# Patient Record
Sex: FEMALE | Race: WHITE | Employment: FULL TIME | URBAN - METROPOLITAN AREA
[De-identification: names, ages, dates, MRNs, and addresses within clinical notes are randomized per-mention and may not be internally consistent; named-entity substitution may affect disease eponyms.]

---

## 2018-01-31 ENCOUNTER — HOSPITAL ENCOUNTER (OUTPATIENT)
Dept: MAMMOGRAPHY | Age: 43
Discharge: HOME | End: 2018-01-31
Attending: OBSTETRICS & GYNECOLOGY
Payer: COMMERCIAL

## 2018-01-31 DIAGNOSIS — Z12.31 VISIT FOR SCREENING MAMMOGRAM: ICD-10-CM

## 2018-01-31 PROCEDURE — 77067 SCR MAMMO BI INCL CAD: CPT

## 2018-02-08 ENCOUNTER — HOSPITAL ENCOUNTER (OUTPATIENT)
Dept: MAMMOGRAPHY | Age: 43
Discharge: HOME | End: 2018-02-08
Attending: OBSTETRICS & GYNECOLOGY
Payer: COMMERCIAL

## 2018-02-08 DIAGNOSIS — R92.8 MAMMOGRAM ABNORMAL: ICD-10-CM

## 2018-02-08 PROCEDURE — 76642 ULTRASOUND BREAST LIMITED: CPT

## 2022-03-22 NOTE — PROGRESS NOTES
HPI: Maldonado Loyola (: 1975) is a 55 y.o. female, patient, here for evaluation of the following chief complaint(s):    Finger Pain (Right middle greater than left index finger clicking, locking, pain for 2+ months. Right hand dominant.)  Patient is seen today to evaluate her hands. She is already had bilateral open carpal tunnel releases with revisions. She now has developed catching, triggering locking mostly involving her right middle and left index finger. This has been worsening since the beginning of . She also is developing milder changes to the left middle finger. She complains of the clicking locking and pain with some burning pain that is local.  She really denies any numbness or tingling. There has been no fall or injury. Vitals:  Ht 5' 2\" (1.575 m)   Wt 145 lb (65.8 kg)   BMI 26.52 kg/m²    Body mass index is 26.52 kg/m². Allergies   Allergen Reactions    Bactrim [Sulfamethoprim Ds] Hives    Minocin [Minocycline] Hives       Current Outpatient Medications   Medication Sig    diazepam (VALIUM) 5 mg tablet Take 1 Tab by mouth every six (6) hours as needed for Anxiety. (Patient not taking: Reported on 3/23/2022)    promethazine (PHENERGAN) 12.5 mg tablet Take 1 Tab by mouth every six (6) hours as needed for Nausea. (Patient not taking: Reported on 3/23/2022)    oxyCODONE-acetaminophen (PERCOCET) 5-325 mg per tablet Take 2 Tabs by mouth every four (4) hours as needed for Pain. (Patient not taking: Reported on 3/23/2022)     No current facility-administered medications for this visit. Past Medical History:   Diagnosis Date    Nausea & vomiting     with anesthesia        Past Surgical History:   Procedure Laterality Date    HX DILATION AND CURETTAGE      X2    HX GYN      ABLATION    HX HEENT      LASIK BILAT EYES    HX HEENT      WISDOM TEETH    HX ORTHOPAEDIC      LEFT KNEE ARTHROSCOPY X2    NEUROLOGICAL PROCEDURE UNLISTED      CARPAL TUNNEL BILAT HANDS. History reviewed. No pertinent family history. Social History     Tobacco Use    Smoking status: Never Smoker    Smokeless tobacco: Never Used   Substance Use Topics    Alcohol use: Yes     Comment: RARELY    Drug use: Not on file        Review of Systems    ROS     Positive for: Musculoskeletal    Last edited by Maggie Velasquez on 3/23/2022  8:48 AM. (History)             Physical Exam    Patient in general demonstrates good elbow, forearm, wrist and hand range of motion bilaterally. She has point tenderness of the right more than left middle finger and the left index finger all at the A1 pulley level. She is clicking and locking most pronounced in the right middle and left index fingers. She has healed scars in the each proximal palm from her prior carpal tunnel surgeries. Imaging:    No new x-rays today    ASSESSMENT/PLAN:  Below is the assessment and plan developed based on review of pertinent history, physical exam, labs, studies, and medications. Patient examination was consistent with stenosing tenosynovitis involving right more than left middle finger and left index fingers. She has had prior revision carpal tunnel releases and ultimately recovered well. I reviewed treatment options and answered her questions. She prefers to undergo bilateral middle and left index trigger finger A1 pulley releases rather than consider injection therapy. I reviewed risks that include but not limited to stiffness, pain, nerve or tendon damage and incomplete relief of pain and locking. Arrangements can be made for this to be performed on an outpatient basis as soon as possible. 1. Right hand pain  2. Trigger middle finger of right hand  3. Status post carpal tunnel release  4. Trigger finger, left middle finger  5. Trigger finger, left index finger      Return for Follow-up 7 to 10 days after surgery. .    An electronic signature was used to authenticate this note.   -- Gale Hernandez MD

## 2022-03-23 ENCOUNTER — OFFICE VISIT (OUTPATIENT)
Dept: ORTHOPEDIC SURGERY | Age: 47
End: 2022-03-23
Payer: COMMERCIAL

## 2022-03-23 VITALS — WEIGHT: 145 LBS | HEIGHT: 62 IN | BODY MASS INDEX: 26.68 KG/M2

## 2022-03-23 DIAGNOSIS — M65.322 TRIGGER FINGER, LEFT INDEX FINGER: ICD-10-CM

## 2022-03-23 DIAGNOSIS — Z98.890 STATUS POST CARPAL TUNNEL RELEASE: ICD-10-CM

## 2022-03-23 DIAGNOSIS — M79.641 RIGHT HAND PAIN: Primary | ICD-10-CM

## 2022-03-23 DIAGNOSIS — M65.331 TRIGGER MIDDLE FINGER OF RIGHT HAND: ICD-10-CM

## 2022-03-23 DIAGNOSIS — M65.332 TRIGGER FINGER, LEFT MIDDLE FINGER: ICD-10-CM

## 2022-03-23 PROCEDURE — 99213 OFFICE O/P EST LOW 20 MIN: CPT | Performed by: ORTHOPAEDIC SURGERY

## 2022-03-23 NOTE — PATIENT INSTRUCTIONS
Trigger Finger: Care Instructions  Overview  A trigger finger is a finger stuck in a bent position. It happens when the tendon that bends and straightens the thumb or finger can't slide smoothly under the ligaments that hold the tendon against the bones. In most cases, it's caused by a bump (nodule) that forms on the tendon. The bent finger usually straightens out on its own. A trigger finger can be painful. But it normally isn't a serious problem. Trigger fingers seem to occur more in some groups of people. These groups include:  · People who have diabetes or arthritis. · People who have injured their hands in the past.  · Musicians. · People who  tools often. Rest and exercises may help your finger relax so that it can bend. You may get a corticosteroid shot. This can reduce swelling and pain. Your doctor may put a splint on your finger. It will give your finger some rest. You may need surgery if the finger keeps locking in a bent position. Follow-up care is a key part of your treatment and safety. Be sure to make and go to all appointments, and call your doctor if you are having problems. It's also a good idea to know your test results and keep a list of the medicines you take. How can you care for yourself at home? · If your doctor put a splint on your finger, wear it as directed. Don't take it off until your doctor says you can. · You may need to change your activities to avoid movements that irritate the finger. · Take your medicines exactly as prescribed. Call your doctor if you think you are having a problem with your medicine. · Ask your doctor if you can take an over-the-counter pain medicine, such as acetaminophen (Tylenol), ibuprofen (Advil, Motrin), or naproxen (Aleve). Be safe with medicines. Read and follow all instructions on the label. · If your doctor recommends exercises, do them as directed. When should you call for help?    Call your doctor now or seek immediate medical care if:    · Your finger locks in a bent position and will not straighten. Watch closely for changes in your health, and be sure to contact your doctor if:    · You do not get better as expected. Where can you learn more? Go to http://www.stephen[employer].com/  Enter M826 in the search box to learn more about \"Trigger Finger: Care Instructions. \"  Current as of: July 1, 2021               Content Version: 13.2  © 2006-2022 [employer] Care instructions adapted under license by [email or web address] (which disclaims liability or warranty for this information). If you have questions about a medical condition or this instruction, always ask your healthcare professional. [address] any warranty or liability for your use of this information.

## 2022-03-25 DIAGNOSIS — M65.331 TRIGGER MIDDLE FINGER OF RIGHT HAND: Primary | ICD-10-CM

## 2022-03-25 DIAGNOSIS — M65.332 TRIGGER FINGER, LEFT MIDDLE FINGER: ICD-10-CM

## 2022-03-25 DIAGNOSIS — M65.322 TRIGGER FINGER, LEFT INDEX FINGER: ICD-10-CM

## 2022-04-11 DIAGNOSIS — M65.332 TRIGGER FINGER, LEFT MIDDLE FINGER: Primary | ICD-10-CM

## 2022-04-11 DIAGNOSIS — M65.322 TRIGGER FINGER, LEFT INDEX FINGER: ICD-10-CM

## 2022-04-11 RX ORDER — HYDROCODONE BITARTRATE AND ACETAMINOPHEN 5; 325 MG/1; MG/1
1 TABLET ORAL
Qty: 15 TABLET | Refills: 0 | Status: SHIPPED | OUTPATIENT
Start: 2022-04-11 | End: 2022-04-14

## 2022-04-20 NOTE — PROGRESS NOTES
HPI: Keshawn Smith (: 1975) is a 55 y.o. female, patient, here for evaluation of the following chief complaint(s):    Surgical Follow-up (Bilateral thumb, index, middle trigger finger release on 22.)  Patient is seen today to evaluate her hands. She is already had bilateral open carpal tunnel releases with revisions. She now has developed catching, triggering locking mostly involving her right middle and left index finger. This has been worsening since the beginning of . She also is developing milder changes to the left middle finger. She complains of the clicking locking and pain with some burning pain that is local.  She really denies any numbness or tingling. She underwent bilateral middle and index trigger finger A1 pulley releases as well as bilateral trigger thumb releases on 2022. Vitals: There were no vitals taken for this visit. There is no height or weight on file to calculate BMI. Allergies   Allergen Reactions    Bactrim [Sulfamethoprim Ds] Hives    Minocin [Minocycline] Hives       Current Outpatient Medications   Medication Sig    diazepam (VALIUM) 5 mg tablet Take 1 Tab by mouth every six (6) hours as needed for Anxiety. (Patient not taking: Reported on 3/23/2022)    promethazine (PHENERGAN) 12.5 mg tablet Take 1 Tab by mouth every six (6) hours as needed for Nausea. (Patient not taking: Reported on 3/23/2022)    oxyCODONE-acetaminophen (PERCOCET) 5-325 mg per tablet Take 2 Tabs by mouth every four (4) hours as needed for Pain. (Patient not taking: Reported on 3/23/2022)     No current facility-administered medications for this visit.        Past Medical History:   Diagnosis Date    Nausea & vomiting     with anesthesia        Past Surgical History:   Procedure Laterality Date    HX DILATION AND CURETTAGE      X2    HX GYN      ABLATION    HX HEENT      LASIK BILAT EYES    HX HEENT      WISDOM TEETH    HX ORTHOPAEDIC      LEFT KNEE ARTHROSCOPY X2  NEUROLOGICAL PROCEDURE UNLISTED      CARPAL TUNNEL BILAT HANDS. History reviewed. No pertinent family history. Social History     Tobacco Use    Smoking status: Never Smoker    Smokeless tobacco: Never Used   Substance Use Topics    Alcohol use: Yes     Comment: RARELY    Drug use: Not on file        Review of Systems   All other systems reviewed and are negative. ROS     Positive for: Musculoskeletal    Last edited by Gilbert Streeter on 4/22/2022  8:40 AM. (History)             Physical Exam    Overall her surgical wounds are healing well bilaterally to the middle index and thumb. There is no redness drainage or sign infection. Good early range of motion and no further locking. She has healed scars in the each proximal palm from her prior carpal tunnel surgeries. Sutures were removed and there is no sign of infection. Imaging:    No new x-rays today    ASSESSMENT/PLAN:  Below is the assessment and plan developed based on review of pertinent history, physical exam, labs, studies, and medications. Patient examination was consistent with stenosing tenosynovitis involving right more than left middle finger and left index fingers. She has had prior revision carpal tunnel releases and ultimately recovered well. I reviewed treatment options and answered her questions. She underwent bilateral thumb, index and middle trigger finger A1 pulley releases on 4/12/2022. She may continue with simple wound care, gentle motion and strength. He needs to especially work on avoid any contracture such as the right middle finger PIP but overall is doing very well. Sutures removed and benzoin and Steri-Strips applied with Ace bandage wraps on 4/22/2022. Follow-up in 3 to 4 weeks. 1. Trigger middle finger of right hand  2. Trigger finger, left middle finger  3. Trigger finger, left index finger  4. Status post carpal tunnel release  5.  Right hand pain      Return in about 4 weeks (around 5/20/2022). An electronic signature was used to authenticate this note.   -- Kya Barajas MD

## 2022-04-22 ENCOUNTER — OFFICE VISIT (OUTPATIENT)
Dept: ORTHOPEDIC SURGERY | Age: 47
End: 2022-04-22
Payer: COMMERCIAL

## 2022-04-22 DIAGNOSIS — M79.641 RIGHT HAND PAIN: ICD-10-CM

## 2022-04-22 DIAGNOSIS — M65.332 TRIGGER FINGER, LEFT MIDDLE FINGER: ICD-10-CM

## 2022-04-22 DIAGNOSIS — Z98.890 STATUS POST CARPAL TUNNEL RELEASE: ICD-10-CM

## 2022-04-22 DIAGNOSIS — M65.322 TRIGGER FINGER, LEFT INDEX FINGER: ICD-10-CM

## 2022-04-22 DIAGNOSIS — M65.331 TRIGGER MIDDLE FINGER OF RIGHT HAND: Primary | ICD-10-CM

## 2022-04-22 PROCEDURE — 99024 POSTOP FOLLOW-UP VISIT: CPT | Performed by: ORTHOPAEDIC SURGERY

## 2024-10-25 ENCOUNTER — HOSPITAL ENCOUNTER (OUTPATIENT)
Facility: HOSPITAL | Age: 49
Discharge: HOME | End: 2024-10-28
Attending: ORTHOPAEDIC SURGERY
Payer: COMMERCIAL

## 2024-10-25 DIAGNOSIS — M50.90 CERVICAL DISC DISEASE: ICD-10-CM

## 2024-10-25 PROCEDURE — 72141 MRI NECK SPINE W/O DYE: CPT

## 2025-04-21 ENCOUNTER — OFFICE VISIT (OUTPATIENT)
Age: 50
End: 2025-04-21
Payer: COMMERCIAL

## 2025-04-21 VITALS
BODY MASS INDEX: 26.31 KG/M2 | HEART RATE: 77 BPM | HEIGHT: 62 IN | RESPIRATION RATE: 17 BRPM | WEIGHT: 143 LBS | OXYGEN SATURATION: 99 % | DIASTOLIC BLOOD PRESSURE: 86 MMHG | SYSTOLIC BLOOD PRESSURE: 120 MMHG

## 2025-04-21 DIAGNOSIS — E03.9 ACQUIRED HYPOTHYROIDISM: ICD-10-CM

## 2025-04-21 DIAGNOSIS — E03.9 ACQUIRED HYPOTHYROIDISM: Primary | ICD-10-CM

## 2025-04-21 PROCEDURE — 99204 OFFICE O/P NEW MOD 45 MIN: CPT | Performed by: INTERNAL MEDICINE

## 2025-04-21 RX ORDER — THYROID,PORK 15 MG
15 TABLET ORAL DAILY
COMMUNITY
Start: 2025-04-13

## 2025-04-21 RX ORDER — PROGESTERONE 100 MG/1
100 CAPSULE ORAL DAILY
COMMUNITY
Start: 2025-04-13

## 2025-04-21 NOTE — PATIENT INSTRUCTIONS
1) I will repeat your thyroid tests to see if you need a change in your dose of armour.   I will send you a message through [employer] with your lab results.  Please call [phone] to reset your [email or web address] password.    2) I will also check your thyroid antibody levels to screen for an autoimmune condition called hashimoto's disease which is the most common cause of hypothyroidism in the world and can be genetic meaning your children could be at risk of developing a thyroid condition in the future.      3) Move the multivitamin to at least 4 hours apart from the armour thyroid.  If you ever do miss a dose of thyroid, it's okay to take 2 pills the next day to catch up on your dose.  The goal is always to get 7 pills per week and even if you have to double up several days in a row to make up for missed doses, this is better than letting your weekly level fall.    4) Whatever plan we decide on, we'll do for 6-8 weeks and then go back to the [facility] lab to repeat your labs.  I will send you a message through [employer] with your lab results.    5) Take 2 of the 15 mg tabs tomorrow only and then 1 tab daily until you hear back from me.

## 2025-04-21 NOTE — PROGRESS NOTES
Melissa Barrett is a 49 y.o. female  Chief Complaint   Patient presents with    New Patient     Agreed to [facility]    Thyroid Problem     Vitals:    04/21/25 1047   BP: 120/86   BP Site: Left Upper Arm   Patient Position: Sitting   Pulse: 77   Resp: 17   SpO2: 99%   Weight: 64.9 kg (143 lb)   Height: 1.575 m (5' 2\")         Health Maintenance Due   Topic Date Due    Depression Screen  Never done    HIV screen  Never done    Hepatitis C screen  Never done    Hepatitis B vaccine (1 of 3 - 19+ 3-dose series) Never done    DTaP/Tdap/Td vaccine (1 - Tdap) Never done    Cervical cancer screen  Never done    Diabetes screen  Never done    Lipids  Never done    Breast cancer screen  01/31/2020    Colorectal Cancer Screen  Never done    COVID-19 Vaccine (1 - 2024-25 season) Never done         \"Have you been to the ER, urgent care clinic since your last visit?  Hospitalized since your last visit?\"    NO    “Have you seen or consulted any other health care providers outside of [facility] since your last visit?”    NO    “Have you had a colorectal cancer screening such as a colonoscopy/FIT/Cologuard?      YES  No colonoscopy on file  No cologuard on file  No FIT/FOBT on file   No flexible sigmoidoscopy on file        Have you had a mammogram?”   YES    Date of last Mammogram: 1/31/2018      “Have you had a pap smear?”    YES    No cervical cancer screening on file

## 2025-04-21 NOTE — PROGRESS NOTES
Chief Complaint   Patient presents with    New Patient     Agreed to [facility]    Thyroid Problem     History of Present Illness: Melissa Barrett is a 49 y.o. female     History of Present Illness  The patient is a 49-year-old female who presents as a new patient for thyroid management.    She was referred to our clinic by [facility] due to persistent symptoms despite ongoing treatment. She has been in menopause for approximately 4 years, and prior to this had a uterine ablation performed 15 years ago. Following the initiation of bioidentical hormone replacement therapy with estrogen and testosterone pellets in 01/2022, she experienced fatigue. Subsequent blood work revealed abnormal thyroid levels. Despite adjustments in her [place] Thyroid dosage, her thyroid function tests have been inconsistent. She reports persistent fatigue, regardless of sleep duration or physical activity. She has not tried Synthroid or levothyroxine. She has previously tried estrogen therapy with her OB-GYN, but it was ineffective. She has been on [place] Thyroid 15 mg daily, with previous doses ranging from 30 to 60 mg. She takes her medication first thing in the morning, waiting at least 30 minutes before eating or drinking anything other than water. She also takes a women's multivitamin in the morning and magnesium glycinate 300 mg at night. She reports regular bowel movements, some hair loss, but no dry skin or nail breakage. She does not experience any temperature irregularities, except for hot flashes during menopause. She reports difficulty losing weight, even with regular exercise, and a tendency to gain weight even when not eating. She has no family history of thyroid disease and did not have any thyroid issues during pregnancy. She reports no neck pain or swelling but experiences choking easily, which her allergist attributes to sinus problems. An endoscopy and nasal scope were performed, and an MRI of her cervical spine in

## 2025-04-22 LAB
T4 FREE SERPL-MCNC: 0.8 NG/DL (ref 0.8–1.5)
TSH SERPL DL<=0.05 MIU/L-ACNC: 2.62 UIU/ML (ref 0.36–3.74)

## 2025-04-23 ENCOUNTER — RESULTS FOLLOW-UP (OUTPATIENT)
Age: 50
End: 2025-04-23

## 2025-04-24 LAB
T3 SERPL-MCNC: 137 NG/DL (ref 71–180)
THYROGLOB AB SERPL-ACNC: <1 IU/ML (ref 0–0.9)
THYROPEROXIDASE AB SERPL-ACNC: <9 IU/ML (ref 0–34)

## 2025-04-24 RX ORDER — LEVOTHYROXINE SODIUM 25 UG/1
TABLET ORAL
Qty: 90 TABLET | Refills: 3 | Status: SHIPPED | OUTPATIENT
Start: 2025-04-24

## 2025-04-24 RX ORDER — LIOTHYRONINE SODIUM 5 UG/1
TABLET ORAL
Qty: 90 TABLET | Refills: 3 | Status: SHIPPED | OUTPATIENT
Start: 2025-04-24

## 2025-06-05 DIAGNOSIS — E03.9 ACQUIRED HYPOTHYROIDISM: ICD-10-CM

## 2025-06-13 LAB
T3 SERPL-MCNC: 146 NG/DL (ref 71–180)
T4 FREE SERPL-MCNC: 0.95 NG/DL (ref 0.82–1.77)
TSH SERPL DL<=0.005 MIU/L-ACNC: 3.85 UIU/ML (ref 0.45–4.5)

## 2025-07-26 DIAGNOSIS — E03.9 ACQUIRED HYPOTHYROIDISM: ICD-10-CM

## 2025-07-31 LAB
T3 SERPL-MCNC: 132 NG/DL (ref 71–180)
T4 FREE SERPL-MCNC: 0.89 NG/DL (ref 0.82–1.77)
TSH SERPL DL<=0.005 MIU/L-ACNC: 2.41 UIU/ML (ref 0.45–4.5)